# Patient Record
Sex: MALE | Race: WHITE | NOT HISPANIC OR LATINO | Employment: FULL TIME | URBAN - METROPOLITAN AREA
[De-identification: names, ages, dates, MRNs, and addresses within clinical notes are randomized per-mention and may not be internally consistent; named-entity substitution may affect disease eponyms.]

---

## 2021-08-01 ENCOUNTER — OFFICE VISIT (OUTPATIENT)
Dept: URGENT CARE | Facility: URGENT CARE | Age: 51
End: 2021-08-01
Payer: COMMERCIAL

## 2021-08-01 VITALS
HEIGHT: 69 IN | DIASTOLIC BLOOD PRESSURE: 86 MMHG | BODY MASS INDEX: 24.44 KG/M2 | HEART RATE: 95 BPM | WEIGHT: 165 LBS | RESPIRATION RATE: 16 BRPM | OXYGEN SATURATION: 98 % | TEMPERATURE: 97.9 F | SYSTOLIC BLOOD PRESSURE: 125 MMHG

## 2021-08-01 DIAGNOSIS — S00.01XA ABRASION OF SCALP WITH INFECTION, INITIAL ENCOUNTER: Primary | ICD-10-CM

## 2021-08-01 DIAGNOSIS — L08.9 ABRASION OF SCALP WITH INFECTION, INITIAL ENCOUNTER: Primary | ICD-10-CM

## 2021-08-01 PROCEDURE — 99203 OFFICE O/P NEW LOW 30 MIN: CPT | Performed by: FAMILY MEDICINE

## 2021-08-01 RX ORDER — CELECOXIB 200 MG/1
200 CAPSULE ORAL 2 TIMES DAILY
COMMUNITY
Start: 2021-07-13

## 2021-08-01 RX ORDER — MONTELUKAST SODIUM 10 MG/1
10 TABLET ORAL
COMMUNITY
Start: 2021-01-08

## 2021-08-01 RX ORDER — NAPROXEN 500 MG/1
500 TABLET ORAL 2 TIMES DAILY WITH MEALS
Qty: 14 TABLET | Refills: 0 | Status: SHIPPED | OUTPATIENT
Start: 2021-08-01 | End: 2021-08-08

## 2021-08-01 RX ORDER — CETIRIZINE HYDROCHLORIDE 10 MG/1
10 TABLET ORAL
COMMUNITY
Start: 2019-12-09

## 2021-08-01 RX ORDER — CEPHALEXIN 500 MG/1
500 CAPSULE ORAL 3 TIMES DAILY
Qty: 30 CAPSULE | Refills: 0 | Status: SHIPPED | OUTPATIENT
Start: 2021-08-01 | End: 2021-08-11

## 2021-08-01 RX ORDER — FLUTICASONE PROPIONATE AND SALMETEROL XINAFOATE 115; 21 UG/1; UG/1
AEROSOL, METERED RESPIRATORY (INHALATION)
COMMUNITY
Start: 2021-05-08

## 2021-08-01 ASSESSMENT — MIFFLIN-ST. JEOR: SCORE: 1603.82

## 2021-08-12 NOTE — PROGRESS NOTES
"SUBJECTIVE: Dev Wiseman is a 51 year old male presenting with a chief complaint of abrasion scalp with tenderness/redness.  Onset of symptoms was day(s) ago.  Course of illness is worsening.      No past medical history on file.  No Known Allergies  Social History     Tobacco Use     Smoking status: Never Smoker     Smokeless tobacco: Never Used   Substance Use Topics     Alcohol use: Not on file       ROS:  SKIN: no rash  GI: no vomiting    OBJECTIVE:  /86   Pulse 95   Temp 97.9  F (36.6  C)   Resp 16   Ht 1.753 m (5' 9\")   Wt 74.8 kg (165 lb)   SpO2 98%   BMI 24.37 kg/m  GENERAL APPEARANCE: healthy, alert and no distress  EYES: EOMI,  PERRL, conjunctiva clear  SKIN: abrasion scalp with tenderness/redness      ICD-10-CM    1. Abrasion of scalp with infection, initial encounter  S00.01XA cephALEXin (KEFLEX) 500 MG capsule    L08.9 naproxen (NAPROSYN) 500 MG tablet       Fluids/Rest, f/u if worse/not any better    "